# Patient Record
Sex: FEMALE | Race: BLACK OR AFRICAN AMERICAN | URBAN - METROPOLITAN AREA
[De-identification: names, ages, dates, MRNs, and addresses within clinical notes are randomized per-mention and may not be internally consistent; named-entity substitution may affect disease eponyms.]

---

## 2018-09-04 ENCOUNTER — OP HISTORICAL/CONVERTED ENCOUNTER (OUTPATIENT)
Dept: OTHER | Age: 43
End: 2018-09-04

## 2023-09-13 ENCOUNTER — HOSPITAL ENCOUNTER (EMERGENCY)
Facility: HOSPITAL | Age: 48
Discharge: HOME | End: 2023-09-14
Attending: STUDENT IN AN ORGANIZED HEALTH CARE EDUCATION/TRAINING PROGRAM
Payer: COMMERCIAL

## 2023-09-13 VITALS
BODY MASS INDEX: 45.99 KG/M2 | HEIGHT: 67 IN | TEMPERATURE: 97.8 F | WEIGHT: 293 LBS | RESPIRATION RATE: 18 BRPM | OXYGEN SATURATION: 99 % | HEART RATE: 87 BPM | SYSTOLIC BLOOD PRESSURE: 164 MMHG | DIASTOLIC BLOOD PRESSURE: 98 MMHG

## 2023-09-13 DIAGNOSIS — N95.9 PREMENOPAUSAL PATIENT: ICD-10-CM

## 2023-09-13 DIAGNOSIS — R23.2 FLUSHING: Primary | ICD-10-CM

## 2023-09-13 LAB
APPEARANCE UR: CLEAR
BACTERIA URNS QL MICRO: NEGATIVE /HPF
BILIRUB UR QL: NEGATIVE
COLOR UR: YELLOW
GLUCOSE UR STRIP.AUTO-MCNC: NEGATIVE MG/DL
HGB UR QL STRIP: ABNORMAL
KETONES UR QL STRIP.AUTO: NEGATIVE MG/DL
LEUKOCYTE ESTERASE UR QL STRIP.AUTO: NEGATIVE
NITRITE UR QL STRIP.AUTO: NEGATIVE
PH UR STRIP: 6.5 (ref 5–8)
PROT UR STRIP-MCNC: ABNORMAL MG/DL
RBC #/AREA URNS HPF: ABNORMAL /HPF (ref 0–5)
SP GR UR REFRACTOMETRY: 1.01 (ref 1–1.03)
URINE CULTURE IF INDICATED: ABNORMAL
UROBILINOGEN UR QL STRIP.AUTO: 0.1 EU/DL (ref 0.2–1)
WBC URNS QL MICRO: ABNORMAL /HPF (ref 0–4)

## 2023-09-13 PROCEDURE — 6370000000 HC RX 637 (ALT 250 FOR IP): Performed by: STUDENT IN AN ORGANIZED HEALTH CARE EDUCATION/TRAINING PROGRAM

## 2023-09-13 PROCEDURE — 81001 URINALYSIS AUTO W/SCOPE: CPT

## 2023-09-13 PROCEDURE — 99284 EMERGENCY DEPT VISIT MOD MDM: CPT

## 2023-09-13 RX ORDER — HYDROCHLOROTHIAZIDE 25 MG/1
12.5 TABLET ORAL
COMMUNITY
Start: 2023-06-09

## 2023-09-13 RX ORDER — HYDROCHLOROTHIAZIDE 25 MG/1
12.5 TABLET ORAL ONCE
Status: COMPLETED | OUTPATIENT
Start: 2023-09-13 | End: 2023-09-13

## 2023-09-13 RX ADMIN — HYDROCHLOROTHIAZIDE 12.5 MG: 25 TABLET ORAL at 23:49

## 2023-09-13 ASSESSMENT — LIFESTYLE VARIABLES
HOW MANY STANDARD DRINKS CONTAINING ALCOHOL DO YOU HAVE ON A TYPICAL DAY: PATIENT DOES NOT DRINK
HOW OFTEN DO YOU HAVE A DRINK CONTAINING ALCOHOL: NEVER

## 2023-09-13 ASSESSMENT — PAIN - FUNCTIONAL ASSESSMENT: PAIN_FUNCTIONAL_ASSESSMENT: 0-10

## 2023-09-13 ASSESSMENT — PAIN SCALES - GENERAL: PAINLEVEL_OUTOF10: 6

## 2023-09-14 NOTE — ED PROVIDER NOTES
[address] [facility] [address]  EMERGENCY DEPARTMENT ENCOUNTER NOTE    Date: 9/13/2023  Patient Name: Price Vega    History of Presenting Illness     Chief Complaint   Patient presents with    Abdominal Pain    Dizziness       History obtained from: Patient    HPI: Price Vega, 49 y.o. female with past medical history as listed and reviewed below presents for an episode of lightheadedness and flushing. The patient is currently passing through premenopause. She has had irregular cycles and started her menstrual period earlier today. She does not have any abdominal pain however she had a few episodes today of flushing with lightheadedness described as faintness without any vertigo. Patient denies any diplopia, dysarthria, facial droop, syncope, seizures, vertigo, or weakness or numbness in the upper or lower extremities. Denies any palpitations, chest pain, or shortness of breath with episodes of lightheadedness. Denies any fevers or chills. No cough. .  She is currently asymptomatic. She has not taken her blood pressure medicine today. Medical History   I reviewed the medical, surgical, family, and social history, as well as allergies:    PCP: No primary care provider on file. Past Medical History:  Past Medical History:   Diagnosis Date    Depression     Hypertension      Past Surgical History:  History reviewed. No pertinent surgical history. Current Outpatient Medications:  Current Outpatient Medications   Medication Instructions    hydroCHLOROthiazide (HYDRODIURIL) 12.5 mg      Family History:  History reviewed. No pertinent family history.   Social History:  Social History     Tobacco Use    Smoking status: Never    Smokeless tobacco: Never   Substance Use Topics    Alcohol use: Not Currently    Drug use: Never     Allergies:  No Known Allergies    Review of Systems     Review of Systems  Negative: Positives and pertinent negatives as per HPI, otherwise negative

## 2023-09-14 NOTE — DISCHARGE INSTRUCTIONS
nurse practitioner, or physician assistant to:  (1) confirm your diagnosis,  (2) re-evaluation of changes in your illness and treatment, and  (3) for ongoing care. If your symptoms become worse or you do not improve as expected and you are unable to reach your usual health care provider, you should return to the Emergency Department. We are available 24 hours a day. Please take your discharge instructions with you when you go to your follow-up appointment. If a prescription has been provided, please have it filled as soon as possible to prevent a delay in treatment. Read the entire medication instruction sheet provided to you by the pharmacy. If you have any questions or reservations about taking the medication due to side effects or interactions with other medications, please call your primary care physician or contact the ER to speak with the charge nurse. Make an appointment with your family doctor or the physician you were referred to for follow-up of this visit as instructed on your discharge paperwork, as this is a mandatory follow-up. Return to the ER if you are unable to be seen or if you are unable to be seen in a timely manner. If you have any problem arranging the follow-up visit, contact the Emergency Department immediately.

## 2023-09-17 LAB
EKG ATRIAL RATE: 85 BPM
EKG DIAGNOSIS: NORMAL
EKG P AXIS: 40 DEGREES
EKG P-R INTERVAL: 172 MS
EKG Q-T INTERVAL: 382 MS
EKG QRS DURATION: 72 MS
EKG QTC CALCULATION (BAZETT): 454 MS
EKG R AXIS: 22 DEGREES
EKG T AXIS: 12 DEGREES
EKG VENTRICULAR RATE: 85 BPM